# Patient Record
Sex: MALE | Race: WHITE
[De-identification: names, ages, dates, MRNs, and addresses within clinical notes are randomized per-mention and may not be internally consistent; named-entity substitution may affect disease eponyms.]

---

## 2018-08-02 LAB
BUN BLD-MCNC: 13 MG/DL (ref 7–18)
GLUCOSE SERPLBLD-MCNC: 117 MG/DL (ref 74–106)
HCT VFR BLD CALC: 44.3 % (ref 39.6–49)
LYMPHOCYTES # SPEC AUTO: 1.3 K/UL (ref 0.7–4.9)
MCH RBC QN AUTO: 32.4 PG (ref 27–35)
MCV RBC: 91.5 FL (ref 80–100)
PMV BLD: 8.7 FL (ref 7.6–11.3)
POTASSIUM SERPL-SCNC: 3.7 MMOL/L (ref 3.5–5.1)
RBC # BLD: 4.84 M/UL (ref 4.33–5.43)

## 2018-08-02 NOTE — EKG
Test Date:    2018-08-02               Test Time:    13:03:52

Technician:   JUAQUIN                                    

                                                     

MEASUREMENT RESULTS:                                       

Intervals:                                           

Rate:         65                                     

UT:           164                                    

QRSD:         82                                     

QT:           410                                    

QTc:          426                                    

Axis:                                                

P:            13                                     

UT:           164                                    

QRS:          19                                     

T:            15                                     

                                                     

INTERPRETIVE STATEMENTS:                                       

                                                     

Normal sinus rhythm

Normal ECG

Compared to ECG 06/16/2016 13:49:08

Sinus bradycardia no longer present



Electronically Signed On 08-02-18 16:51:58 CDT by Jose Elias Alicia

## 2018-08-03 ENCOUNTER — HOSPITAL ENCOUNTER (OUTPATIENT)
Dept: HOSPITAL 97 - OR | Age: 63
Discharge: HOME | End: 2018-08-03
Attending: ORTHOPAEDIC SURGERY
Payer: COMMERCIAL

## 2018-08-03 DIAGNOSIS — M13.812: Primary | ICD-10-CM

## 2018-08-03 DIAGNOSIS — Z53.33: ICD-10-CM

## 2018-08-03 PROCEDURE — 36415 COLL VENOUS BLD VENIPUNCTURE: CPT

## 2018-08-03 PROCEDURE — 0PBB0ZZ EXCISION OF LEFT CLAVICLE, OPEN APPROACH: ICD-10-PCS

## 2018-08-03 PROCEDURE — 93005 ELECTROCARDIOGRAM TRACING: CPT

## 2018-08-03 PROCEDURE — 80048 BASIC METABOLIC PNL TOTAL CA: CPT

## 2018-08-03 PROCEDURE — 85025 COMPLETE CBC W/AUTO DIFF WBC: CPT

## 2018-08-03 PROCEDURE — 0RNK4ZZ RELEASE LEFT SHOULDER JOINT, PERCUTANEOUS ENDOSCOPIC APPROACH: ICD-10-PCS

## 2018-08-03 RX ADMIN — MEPERIDINE HYDROCHLORIDE ONE MG: 50 INJECTION, SOLUTION INTRAMUSCULAR; INTRAVENOUS; SUBCUTANEOUS at 13:57

## 2018-08-03 RX ADMIN — MEPERIDINE HYDROCHLORIDE ONE MG: 50 INJECTION, SOLUTION INTRAMUSCULAR; INTRAVENOUS; SUBCUTANEOUS at 14:07

## 2018-08-03 NOTE — OP
Date of Procedure:  08/03/2018



Surgeon:  Riley Aguilera MD



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
le partial-thickness rotator cuff tear.



Postoperative Diagnosis:  Left shoulder pain with AC joint arthritis and impingement as well as proba
ble partial-thickness rotator cuff tear with some fraying of the biceps anchor.



Procedures:  

1.Arthroscopic limited debridement.

2.Mini open distal clavicle resection with subacromial decompression.



Estimated Blood Loss:  Less than 10 cc.



Complications:  There were no complications.



Specimens:  No pathology specimens sent.



Indications For Operation:  Mr. Zhu has been troubled with his left shoulder for sometime.  This p
roceeds despite all conservative measures including injections and pharmacological measures.  At this
 time, all risks, benefits, and alternatives to shoulder surgery were discussed with him, both in the
 office as well as in the holding area.  He states he understands everything as presented and wishes 
to proceed.



Description Of Procedure:  The patient was taken to the operating room and placed in supine position.
  General anesthesia was obtained by staff.  Following this, he has been placed in a beach chair posi
tion.  All bony prominences being checked by Anesthesia.  His left upper extremity was then prepped a
nd draped in the usual sterile fashion for the procedure.  A standard posterior arthroscopy portal wa
s then placed atraumatically with 1 pass.  The camera was placed within the shoulder joint and the sh
oulder joint was examined.  The anterior labrum and biceps anchor did have a small amount of fraying.
  Otherwise, the shoulder appeared to be very good with no real undersurface fraying of the rotator c
uff.  The biceps tendon otherwise was good.  An anterior portal was then made under direct vision for
 placement of the shaver.  There was some small shaving done at the biceps anchor and the probe was u
sed to enter.  The remainder of the shoulder was investigated.  Following this, the arthroscopic inst
ruments were removed.  The posterior portal was stable.  All gloves and instruments were changed, and
 the arm was then re-prepped.  A standard anterior incision was taken down carefully through skin and
 soft tissue.  Meticulous hemostasis being maintained using Bovie electrocautery.  This leads down to
 the deltoid.  The deltoid was then removed off the distal clavicle as well as the most anterior port
ion of the acromion and tagged for later repair.  This includes release of the coracoacromial ligamen
t.  After this, there was some bursal tissue which was removed.  The intervening material between the
 clavicle and the acromion was removed to establish good landmarks.  The rotator cuff was then protec
migdalia.  Anterior and subacromial decompression was undertaken using a saw as well as a rasp.  This allo
wed for good visualization of the rotator cuff.  It was visualized throughout its entirety.  There wa
s found to be no significant change from normal to direct inspection.  Also with gentle use of a britney
eur, I could not establish a rotator cuff tear.  Therefore, decision was made not to address the rota
tor cuff proper.  The wound was then copiously irrigated and the deltoid was repaired back to the acr
omion using bone tunnels as well as directly oversewn.  The interval between the clavicle and the acr
omion was then oversewn.  The wound was irrigated.  The skin was closed using interrupted Vicryl sutu
res followed by staples.  The patient was then awakened and taken to recovery room in good condition.
  There were no complications.





/BERKLEY

DD:  08/03/2018 13:17:55Voice ID:  [id number]

DT:  08/03/2018 18:52:09Report ID:  [id number]

## 2018-08-03 NOTE — P.BOP
Preoperative diagnosis: left shoulder impingment/AC arthritis, probable partial 

thikness RCT


Postoperative diagnosis: Same with fraying of biceps anchor


Primary procedure: Arthoscopic limited debridment, mini open sad, dcr


Estimated blood loss: 10ccs


Anesthesia: General


Complications: None


Transferred to: Recovery Room


Condition: Good